# Patient Record
Sex: MALE | Race: WHITE | Employment: OTHER | URBAN - METROPOLITAN AREA
[De-identification: names, ages, dates, MRNs, and addresses within clinical notes are randomized per-mention and may not be internally consistent; named-entity substitution may affect disease eponyms.]

---

## 2017-01-03 PROBLEM — C79.51 SECONDARY MALIGNANT NEOPLASM OF BONE (HCC): Status: ACTIVE | Noted: 2017-01-03

## 2017-04-04 ENCOUNTER — HOSPITAL ENCOUNTER (OUTPATIENT)
Dept: SURGERY | Age: 82
Discharge: HOME | End: 2017-04-04
Attending: RADIOLOGY | Admitting: RADIOLOGY

## 2017-04-04 PROBLEM — N13.5 URETERAL OBSTRUCTION, LEFT: Status: ACTIVE | Noted: 2017-04-04

## 2017-04-04 PROBLEM — C61 PROSTATE CANCER METASTATIC TO MULTIPLE SITES (HCC): Status: ACTIVE | Noted: 2017-04-04

## 2017-04-14 ENCOUNTER — HOSPITAL ENCOUNTER (OUTPATIENT)
Dept: INTERVENTIONAL RADIOLOGY/VASCULAR | Age: 82
Discharge: HOME | End: 2017-04-14
Attending: UROLOGY | Admitting: UROLOGY

## 2017-04-14 DIAGNOSIS — N13.30 HYDRONEPHROSIS, UNSPECIFIED HYDRONEPHROSIS TYPE: ICD-10-CM

## 2017-07-03 ENCOUNTER — OFFICE VISIT (OUTPATIENT)
Dept: CARDIOLOGY CLINIC | Age: 82
End: 2017-07-03

## 2017-07-03 VITALS
BODY MASS INDEX: 28.44 KG/M2 | WEIGHT: 210 LBS | HEART RATE: 108 BPM | SYSTOLIC BLOOD PRESSURE: 110 MMHG | HEIGHT: 72 IN | DIASTOLIC BLOOD PRESSURE: 67 MMHG

## 2017-07-03 DIAGNOSIS — I49.9 IRREGULAR HEART RATE: Primary | ICD-10-CM

## 2017-07-03 DIAGNOSIS — I10 ESSENTIAL HYPERTENSION: ICD-10-CM

## 2017-07-03 DIAGNOSIS — I48.19 PERSISTENT ATRIAL FIBRILLATION (HCC): ICD-10-CM

## 2017-07-03 DIAGNOSIS — C61 PROSTATE CANCER METASTATIC TO MULTIPLE SITES (HCC): ICD-10-CM

## 2017-07-03 DIAGNOSIS — E78.49 OTHER HYPERLIPIDEMIA: ICD-10-CM

## 2017-07-03 PROCEDURE — 4004F PT TOBACCO SCREEN RCVD TLK: CPT | Performed by: INTERNAL MEDICINE

## 2017-07-03 PROCEDURE — 1123F ACP DISCUSS/DSCN MKR DOCD: CPT | Performed by: INTERNAL MEDICINE

## 2017-07-03 PROCEDURE — 4040F PNEUMOC VAC/ADMIN/RCVD: CPT | Performed by: INTERNAL MEDICINE

## 2017-07-03 PROCEDURE — G8419 CALC BMI OUT NRM PARAM NOF/U: HCPCS | Performed by: INTERNAL MEDICINE

## 2017-07-03 PROCEDURE — G8427 DOCREV CUR MEDS BY ELIG CLIN: HCPCS | Performed by: INTERNAL MEDICINE

## 2017-07-03 PROCEDURE — 93000 ELECTROCARDIOGRAM COMPLETE: CPT | Performed by: INTERNAL MEDICINE

## 2017-07-03 PROCEDURE — 99214 OFFICE O/P EST MOD 30 MIN: CPT | Performed by: INTERNAL MEDICINE

## 2017-07-03 RX ORDER — PREDNISONE 10 MG/1
TABLET ORAL
Qty: 10 TABLET | Refills: 0
Start: 2017-07-03

## 2017-07-03 RX ORDER — DILTIAZEM HYDROCHLORIDE 240 MG/1
300 CAPSULE, COATED, EXTENDED RELEASE ORAL DAILY
Qty: 30 CAPSULE | Refills: 6
Start: 2017-07-03 | End: 2017-10-02

## 2017-07-06 ENCOUNTER — TELEPHONE (OUTPATIENT)
Dept: CARDIOLOGY CLINIC | Age: 82
End: 2017-07-06

## 2017-07-06 DIAGNOSIS — I48.19 PERSISTENT ATRIAL FIBRILLATION (HCC): ICD-10-CM

## 2017-07-06 DIAGNOSIS — Z79.899 ENCOUNTER FOR MONITORING DIGOXIN THERAPY: Primary | ICD-10-CM

## 2017-07-06 DIAGNOSIS — Z51.81 ENCOUNTER FOR MONITORING DIGOXIN THERAPY: Primary | ICD-10-CM

## 2017-07-06 RX ORDER — DIGOXIN 125 MCG
250 TABLET ORAL DAILY
Qty: 30 TABLET | Refills: 3 | Status: SHIPPED | OUTPATIENT
Start: 2017-07-06 | End: 2017-12-05 | Stop reason: SDUPTHER

## 2017-09-29 ENCOUNTER — TELEPHONE (OUTPATIENT)
Dept: CARDIOLOGY CLINIC | Age: 82
End: 2017-09-29

## 2017-10-02 ENCOUNTER — TELEPHONE (OUTPATIENT)
Dept: CARDIOLOGY CLINIC | Age: 82
End: 2017-10-02

## 2017-10-02 ENCOUNTER — OFFICE VISIT (OUTPATIENT)
Dept: CARDIOLOGY CLINIC | Age: 82
End: 2017-10-02

## 2017-10-02 VITALS
HEIGHT: 72 IN | BODY MASS INDEX: 27.9 KG/M2 | HEART RATE: 94 BPM | DIASTOLIC BLOOD PRESSURE: 59 MMHG | WEIGHT: 206 LBS | SYSTOLIC BLOOD PRESSURE: 126 MMHG

## 2017-10-02 DIAGNOSIS — I10 ESSENTIAL HYPERTENSION: ICD-10-CM

## 2017-10-02 DIAGNOSIS — I48.19 PERSISTENT ATRIAL FIBRILLATION (HCC): ICD-10-CM

## 2017-10-02 DIAGNOSIS — D64.9 ANEMIA, UNSPECIFIED TYPE: Primary | ICD-10-CM

## 2017-10-02 DIAGNOSIS — E78.2 MIXED HYPERLIPIDEMIA: ICD-10-CM

## 2017-10-02 PROCEDURE — 99214 OFFICE O/P EST MOD 30 MIN: CPT | Performed by: INTERNAL MEDICINE

## 2017-10-02 PROCEDURE — 4040F PNEUMOC VAC/ADMIN/RCVD: CPT | Performed by: INTERNAL MEDICINE

## 2017-10-02 PROCEDURE — G8484 FLU IMMUNIZE NO ADMIN: HCPCS | Performed by: INTERNAL MEDICINE

## 2017-10-02 PROCEDURE — 1123F ACP DISCUSS/DSCN MKR DOCD: CPT | Performed by: INTERNAL MEDICINE

## 2017-10-02 PROCEDURE — G8427 DOCREV CUR MEDS BY ELIG CLIN: HCPCS | Performed by: INTERNAL MEDICINE

## 2017-10-02 PROCEDURE — G8417 CALC BMI ABV UP PARAM F/U: HCPCS | Performed by: INTERNAL MEDICINE

## 2017-10-02 PROCEDURE — 4004F PT TOBACCO SCREEN RCVD TLK: CPT | Performed by: INTERNAL MEDICINE

## 2017-10-02 RX ORDER — PROCHLORPERAZINE MALEATE 10 MG
10 TABLET ORAL EVERY 6 HOURS PRN
COMMUNITY

## 2017-10-02 RX ORDER — DILTIAZEM HYDROCHLORIDE 300 MG/1
300 CAPSULE, COATED, EXTENDED RELEASE ORAL DAILY
COMMUNITY

## 2017-10-02 RX ORDER — DIAPER,BRIEF,INFANT-TODD,DISP
EACH MISCELLANEOUS 2 TIMES DAILY
COMMUNITY

## 2017-10-02 RX ORDER — ALLOPURINOL 100 MG/1
100 TABLET ORAL DAILY
COMMUNITY

## 2017-10-02 RX ORDER — AMOXICILLIN 250 MG
1 CAPSULE ORAL NIGHTLY PRN
COMMUNITY

## 2017-10-02 RX ORDER — HYOSCYAMINE SULFATE 0.125 MG
125 TABLET ORAL
COMMUNITY

## 2017-10-02 RX ORDER — CEPHALEXIN 500 MG/1
500 CAPSULE ORAL 3 TIMES DAILY
COMMUNITY

## 2017-10-02 NOTE — PROGRESS NOTES
Cardiac Follow Up    Referring Provider:  Judit Baltazar MD     Chief Complaint   Patient presents with    Follow-up     to discuss eliquis. Just seen in ER yesterday at [facility].  Atrial Fibrillation     Taking Eliquis.  Hypertension    Hyperlipidemia    Anemia     Low H&H. History of Present Illness:  Mr. Tequila Pitt is an 80year old gentleman here today for follow up of his history of atrial fibrillation,  hyperlipidemia and hypertension. He has undergone CV in the remote past, but he did not maintain sinus rhythm. He does not smoke or drink alcohol. He tells me he feels off balance at times. He states he has to hold onto things when walking. He has 2 ureteral stents in place Rt & Lt with chronic hematuria secondary to prostate cancer (had one in place which was removed in August and replaced); Dr. Jesusita Hutchinson stopped his Coumadin temporarily, but the hematuria has never abated. Mrs. Tequila Pitt states that Dr. Jesusita Hutchinson said he would likely bleed as long as the stents were in place. He was placed on Eliquis at last visit. He was seen in the ER at [facility] for right flank pain. Abx injection given, now on oral. Blood work showed very low H&H but blood was not initiated. He feels very weak and tired, low energy. He also just finished his 8th chemo treatment. He denies chest pain, PND, palpitations, light-headedness. He has chronic SOB with activity. His wife is with him for the visit. Past Medical History:   has a past medical history of Hydronephrosis; Hyperlipidemia; and Osteoarthritis. Surgical History:   has a past surgical history that includes hernia repair. Social History:   reports that he quit smoking about 53 years ago. He uses smokeless tobacco. He reports that he drinks alcohol. Family History:  family history includes Heart Disease in his mother; High Blood Pressure in his mother. There is no history of Stroke.      Home Medications:  Outpatient Prescriptions Marked as Taking for the 10/2/17 encounter (Office Visit) with Sienna Lawton MD   Medication Sig Dispense Refill    diltiazem (CARTIA XT) 300 MG extended release capsule Take 300 mg by mouth daily      allopurinol (ZYLOPRIM) 100 MG tablet Take 100 mg by mouth daily      hydrocortisone 0.5 % cream Apply topically 2 times daily Apply topically 2 times daily.  cephALEXin (KEFLEX) 500 MG capsule Take 500 mg by mouth 3 times daily      hyoscyamine (ANASPAZ;LEVSIN) 125 MCG tablet Take 125 mcg by mouth 1 to 2 tablets 4 times a day      prochlorperazine (COMPAZINE) 10 MG tablet Take 10 mg by mouth every 6 hours as needed      GINSENG PO Take 400 mcg by mouth Takes 2 tablets twice a day      senna-docusate (SENNA-PLUS) 8.6-50 MG per tablet Take 1 tablet by mouth nightly as needed for Constipation      digoxin (LANOXIN) 125 MCG tablet Take 2 tablets by mouth daily 30 tablet 3    predniSONE (DELTASONE) 10 MG tablet Take 5mg (0.5 tabs) twice a day. (Patient taking differently: Take 5mg (0.5 tabs)  daily. ) 10 tablet 0    apixaban (ELIQUIS) 2.5 MG TABS tablet Take 1 tablet by mouth 2 times daily 180 tablet 3    calcium carbonate (OSCAL) 500 MG TABS tablet Take 500 mg by mouth daily      Multiple Vitamins-Minerals (THERAPEUTIC MULTIVITAMIN-MINERALS) tablet Take 1 tablet by mouth daily      atorvastatin (LIPITOR) 10 MG tablet Take 10 mg by mouth daily.  LORazepam (ATIVAN) 1 MG tablet Take 1 mg by mouth nightly.  levothyroxine (SYNTHROID) 50 MCG tablet Take 88 mcg by mouth Daily       meclizine (ANTIVERT) 25 MG tablet Take 25 mg by mouth 2 times daily       vitamin B-12 (CYANOCOBALAMIN) 100 MCG tablet Take 50 mcg by mouth daily.  Omega-3 Fatty Acids (FISH OIL PO) Take  by mouth daily. Allergies:  Review of patient's allergies indicates no known allergies. Review of Systems:   · Constitutional: there has been no unanticipated weight loss.  There's been no change in energy level, sleep pattern, or activity level.     · Eyes: No visual changes or diplopia. No scleral icterus. · ENT: No Headaches, hearing loss or vertigo. No mouth sores or sore throat. · Cardiovascular: Reviewed in HPI  · Respiratory: No cough or wheezing, no sputum production. Shortness of breath  · Gastrointestinal: No abdominal pain, appetite loss, blood in stools. No change in bowel or bladder habits. · Genitourinary: No dysuria, trouble voiding, or hematuria. · Musculoskeletal:  No gait disturbance, weakness or joint complaints. · Integumentary: No rash or pruritis. · Neurological: feels off balance  · Psychiatric: No anxiety, no depression. · Endocrine: No malaise, fatigue or temperature intolerance. No excessive thirst, fluid intake, or urination. No tremor. · Hematologic/Lymphatic: No abnormal bruising or bleeding, blood clots or swollen lymph nodes. · Allergic/Immunologic: No nasal congestion or hives. Physical Examination:    Vitals:    10/02/17 1302   BP: (!) 126/59   Pulse: 94        Constitutional and General Appearance:  Respiratory:  · Normal excursion and expansion without use of accessory muscles  · Resp Auscultation: Normal breath sounds without dullness  Cardiovascular:  · The apical impulse is not displaced  · Irregularly irregular rhythm, no rub, or gallop  + II/VI NED  · Cervical veins are not engorged  · The carotid upstroke is normal in amplitude and contour without delay or bruit  · There is no clubbing, cyanosis of the extremities. · No edema  · Femoral Arteries: 2+ and equal  · Pedal Pulses: 2+ and equal   Abdomen:  · No masses or tenderness  · Bowel sounds present  · No organomegaly appreciated  Neurological/Psychiatric:  · Alert and oriented in all spheres  · Moves all extremities well  · Exhibits normal gait balance and coordination  · No abnormalities of mood, affect, memory, mentation, or behavior are noted    Assessment/Plan:     1. Atrial fibrillation, persistent: HR irregular, controlled.      EKG 7/3/17> AF, RBBB, HR 97. Started Eliquis 2.5mg at last visit. Will now stop it due to his low H&H.   2. Hypertension: BP (!) 126/59 (Site: Right Arm, Position: Sitting, Cuff Size: Medium Adult)  Pulse 94  Ht 6' (1.829 m)  Wt 206 lb (93.4 kg)  BMI 27.94 kg/m2 -- stable   3. Hyperlipidemia: Managed per PCP. 4. Shortness of breath: Stable. No complaints today   7/2/14 Stress echo>  Patient's rhythm appears to be atrial fibrillation. Normal left ventricle size, wall thickness and systolic function with an  estimated ejection fraction of 55%. Mitral annular calcification is present. Mitral valve prolapse of posterior leaflet of mitral valve. Moderate mitral regurgitation is present. The left atrium is dilated. The aortic valve appears sclerotic but opens well. Mild aortic regurgitation is present. There is mild tricuspid regurgitation with RVSP estimated at 31 mmHg. Mild pulmonic regurgitation present. 70% hyperdynamic after stress. 5.  Prostate cancer: Has Lt. ureteral stent due to a \"blood clot\" problem. Dr. Elena Yu stopped his Coumadin as he continues with hematuria (has not abated off Coumadin, likely will continue to bleed while stent in place according to Dr. Elena Yu). Just finished 8 chemo treatments. 6. Anemia: Multifactorial. H&H 7.5 / 24 from 9/26/17. Feels weak and tired (has been on chemo also). Mr. Theresa Bernal has a stable cardiac status. He continues to struggle with his prostate cancer issues, just finished a round of 8 chemo treatments. He is also very anemic, HR 90's. 1. Stop Eliquis due to low H&H. Slip given for CBC and T&S.  2. Will continue with risk factor modifications. 3. Return for regular follow up in 1 month. 4. I spoke to Dr. Leno Crowe (oncologist). I advised Felicia Gaelyobanifly to stop in at his office prior to having blood drawn this afternoon. I appreciate the opportunity of cooperating in the care of this individual.    Gabby Otero.  Amari Maurice M.D., [facility] [place]

## 2017-10-02 NOTE — LETTER
[address] Cardiology [address] [address]  Phone: [phone]  Fax: [phone]    Tali Quijano MD        October 12, 2017     Cira Spring MD  [address]    Patient: Femi Villa  MR Number: [id number]  YOB: 1933  Date of Visit: 10/2/2017    Dear Dr. Cira Spring: Thank you for the request for consultation for [facility] to me for the evaluation of ***. Below are the relevant portions of my assessment and plan of care. Cardiac Follow Up    Referring Provider:  Cira Spring MD     Chief Complaint   Patient presents with    Follow-up     to discuss eliquis. Just seen in ER yesterday at [facility].  Atrial Fibrillation     Taking Eliquis.  Hypertension    Hyperlipidemia    Anemia     Low H&H. History of Present Illness:  Mr. Kd Eckert is an 80year old gentleman here today for follow up of his history of atrial fibrillation,  hyperlipidemia and hypertension. He has undergone CV in the remote past, but he did not maintain sinus rhythm. He does not smoke or drink alcohol. He tells me he feels off balance at times. He states he has to hold onto things when walking. He has 2 ureteral stents in place Rt & Lt with chronic hematuria secondary to prostate cancer (had one in place which was removed in August and replaced); Dr. Alan Lovelace stopped his Coumadin temporarily, but the hematuria has never abated. Mrs. Kd Eckert states that Dr. Alan Lovelace said he would likely bleed as long as the stents were in place. He was placed on Eliquis at last visit. He was seen in the ER at [facility] for right flank pain. Abx injection given, now on oral. Blood work showed very low H&H but blood was not initiated. He feels very weak and tired, low energy. He also just finished his 8th chemo treatment. He denies chest pain, PND, palpitations, light-headedness. He has chronic SOB with activity. His wife is with him for the visit.     Past Medical History: has a past medical history of Hydronephrosis; Hyperlipidemia; and Osteoarthritis. Surgical History:   has a past surgical history that includes hernia repair. Social History:   reports that he quit smoking about 53 years ago. He uses smokeless tobacco. He reports that he drinks alcohol. Family History:  family history includes Heart Disease in his mother; High Blood Pressure in his mother. There is no history of Stroke. Home Medications:  Outpatient Prescriptions Marked as Taking for the 10/2/17 encounter (Office Visit) with Lotus Lantigua MD   Medication Sig Dispense Refill    diltiazem (CARTIA XT) 300 MG extended release capsule Take 300 mg by mouth daily      allopurinol (ZYLOPRIM) 100 MG tablet Take 100 mg by mouth daily      hydrocortisone 0.5 % cream Apply topically 2 times daily Apply topically 2 times daily.  cephALEXin (KEFLEX) 500 MG capsule Take 500 mg by mouth 3 times daily      hyoscyamine (ANASPAZ;LEVSIN) 125 MCG tablet Take 125 mcg by mouth 1 to 2 tablets 4 times a day      prochlorperazine (COMPAZINE) 10 MG tablet Take 10 mg by mouth every 6 hours as needed      GINSENG PO Take 400 mcg by mouth Takes 2 tablets twice a day      senna-docusate (SENNA-PLUS) 8.6-50 MG per tablet Take 1 tablet by mouth nightly as needed for Constipation      digoxin (LANOXIN) 125 MCG tablet Take 2 tablets by mouth daily 30 tablet 3    predniSONE (DELTASONE) 10 MG tablet Take 5mg (0.5 tabs) twice a day. (Patient taking differently: Take 5mg (0.5 tabs)  daily. ) 10 tablet 0    apixaban (ELIQUIS) 2.5 MG TABS tablet Take 1 tablet by mouth 2 times daily 180 tablet 3    calcium carbonate (OSCAL) 500 MG TABS tablet Take 500 mg by mouth daily      Multiple Vitamins-Minerals (THERAPEUTIC MULTIVITAMIN-MINERALS) tablet Take 1 tablet by mouth daily      atorvastatin (LIPITOR) 10 MG tablet Take 10 mg by mouth daily.  LORazepam (ATIVAN) 1 MG tablet Take 1 mg by mouth nightly.  levothyroxine (SYNTHROID) 50 MCG tablet Take 88 mcg by mouth Daily       meclizine (ANTIVERT) 25 MG tablet Take 25 mg by mouth 2 times daily       vitamin B-12 (CYANOCOBALAMIN) 100 MCG tablet Take 50 mcg by mouth daily.  Omega-3 Fatty Acids (FISH OIL PO) Take  by mouth daily. Allergies:  Review of patient's allergies indicates no known allergies. Review of Systems:   · Constitutional: there has been no unanticipated weight loss. There's been no change in energy level, sleep pattern, or activity level. · Eyes: No visual changes or diplopia. No scleral icterus. · ENT: No Headaches, hearing loss or vertigo. No mouth sores or sore throat. · Cardiovascular: Reviewed in HPI  · Respiratory: No cough or wheezing, no sputum production. Shortness of breath  · Gastrointestinal: No abdominal pain, appetite loss, blood in stools. No change in bowel or bladder habits. · Genitourinary: No dysuria, trouble voiding, or hematuria. · Musculoskeletal:  No gait disturbance, weakness or joint complaints. · Integumentary: No rash or pruritis. · Neurological: feels off balance  · Psychiatric: No anxiety, no depression. · Endocrine: No malaise, fatigue or temperature intolerance. No excessive thirst, fluid intake, or urination. No tremor. · Hematologic/Lymphatic: No abnormal bruising or bleeding, blood clots or swollen lymph nodes. · Allergic/Immunologic: No nasal congestion or hives.     Physical Examination:    Vitals:    10/02/17 1302   BP: (!) 126/59   Pulse: 94        Constitutional and General Appearance:  Respiratory:  · Normal excursion and expansion without use of accessory muscles  · Resp Auscultation: Normal breath sounds without dullness  Cardiovascular:  · The apical impulse is not displaced  · Irregularly irregular rhythm, no rub, or gallop  + II/VI NED  · Cervical veins are not engorged  · The carotid upstroke is normal in amplitude and contour without delay or bruit Mr. Tyler Hernandes has a stable cardiac status. He continues to struggle with his prostate cancer issues, just finished a round of 8 chemo treatments. He is also very anemic, HR 90's. 1. Stop Eliquis due to low H&H. Slip given for CBC and T&S.  2. Will continue with risk factor modifications. 3. Return for regular follow up in 1 month. 4. I spoke to Dr. Sam Metz (oncologist). I advised Amanda Mock to stop in at his office prior to having blood drawn this afternoon. I appreciate the opportunity of cooperating in the care of this individual.    Vinny Allen. Hedy Mendiola M.D., [facility] [place]            If you have questions, please do not hesitate to call me. I look forward to following Amanda Mock along with you.     Sincerely,        Jeanmarie Barboza MD

## 2017-10-02 NOTE — MR AVS SNAPSHOT
After Visit Summary             Modesta Villa   10/2/2017 1:00 PM   Office Visit    Description:  Male : 10/14/1933   Provider:  Christopher Jones MD   Department:  [place] [address] and Future Appointments         Below is a list of your follow-up and future appointments. This may not be a complete list as you may have made appointments directly with providers that we are not aware of or your providers may have made some for you. Please call your providers to confirm appointments. It is important to keep your appointments. Please bring your current insurance card, photo ID, co-pay, and all medication bottles to your appointment. If self-pay, payment is expected at the time of service. Your To-Do List     Future Orders Complete By Expires    CBC WITH AUTO DIFFERENTIAL [CCT3704 Custom]  10/2/2017 (Approximate) 10/2/2018    TYPE AND SCREEN [OHH956 Custom]  10/2/2017 (Approximate) 10/2/2018    Follow-Up    Return in about 4 weeks (around 10/30/2017). Information from Your Visit        Department     Name Address Phone Fax    [address] [phone]      You Were Seen for:         Comments    Anemia, unspecified type   [[id number]]         Vital Signs     Blood Pressure Pulse Height Weight Body Mass Index Smoking Status    126/59 (Site: Right Arm, Position: Sitting, Cuff Size: Medium Adult) 94 6' (1.829 m) 206 lb (93.4 kg) 27.94 kg/m2 Former Smoker      Additional Information about your Body Mass Index (BMI)           Your BMI as listed above is considered overweight (25.0-29.9). BMI is an estimate of body fat, calculated from your height and weight. The higher your BMI, the greater your risk of heart disease, high blood pressure, type 2 diabetes, stroke, gallstones, arthritis, sleep apnea, and certain cancers. BMI is not perfect.   It may overestimate body fat in athletes and people who are more muscular. If your body fat is high you can improve your BMI by decreasing your calorie intake and becoming more physically active. Learn more at: [email or web address] [email or web address].comco.uk             Today's Medication Changes          These changes are accurate as of: 10/2/17  1:33 PM.  If you have any questions, ask your nurse or doctor. CHANGE how you take these medications           CARTIA  MG extended release capsule   Instructions: Take 300 mg by mouth daily   Refills:  0   Generic drug:  diltiazem   What changed:  Another medication with the same name was removed. Continue taking this medication, and follow the directions you see here. Changed by:  Uma Marquez MD         STOP taking these medications           apixaban 2.5 MG Tabs tablet   Commonly known as:  ELIQUIS   Stopped by:  Uma Marquez MD               Your Current Medications Are              diltiazem (CARTIA XT) 300 MG extended release capsule Take 300 mg by mouth daily    allopurinol (ZYLOPRIM) 100 MG tablet Take 100 mg by mouth daily    hydrocortisone 0.5 % cream Apply topically 2 times daily Apply topically 2 times daily. cephALEXin (KEFLEX) 500 MG capsule Take 500 mg by mouth 3 times daily    hyoscyamine (ANASPAZ;LEVSIN) 125 MCG tablet Take 125 mcg by mouth 1 to 2 tablets 4 times a day    prochlorperazine (COMPAZINE) 10 MG tablet Take 10 mg by mouth every 6 hours as needed    GINSENG PO Take 400 mcg by mouth Takes 2 tablets twice a day    senna-docusate (SENNA-PLUS) 8.6-50 MG per tablet Take 1 tablet by mouth nightly as needed for Constipation    digoxin (LANOXIN) 125 MCG tablet Take 2 tablets by mouth daily    predniSONE (DELTASONE) 10 MG tablet Take 5mg (0.5 tabs) twice a day.     calcium carbonate (OSCAL) 500 MG TABS tablet Take 500 mg by mouth daily    Multiple Vitamins-Minerals (THERAPEUTIC MULTIVITAMIN-MINERALS) tablet Take 1 tablet by mouth daily atorvastatin (LIPITOR) 10 MG tablet Take 10 mg by mouth daily. LORazepam (ATIVAN) 1 MG tablet Take 1 mg by mouth nightly. levothyroxine (SYNTHROID) 50 MCG tablet Take 88 mcg by mouth Daily     meclizine (ANTIVERT) 25 MG tablet Take 25 mg by mouth 2 times daily     vitamin B-12 (CYANOCOBALAMIN) 100 MCG tablet Take 50 mcg by mouth daily. Omega-3 Fatty Acids (FISH OIL PO) Take  by mouth daily. Allergies           No Known Allergies         Additional Information        Basic Information     Date Of Birth Sex Race Ethnicity Preferred Language    10/14/1933 Male White Non-/Non  English      Problem List as of 10/2/2017  Date Reviewed: 10/2/2017                Anemia    Prostate cancer metastatic to multiple sites [facility])    Ureteral obstruction, left    Secondary malignant neoplasm of bone (HCC)    Atrial fibrillation (HCC)    Hypertension    Hyperlipidemia    Shortness of breath      Preventive Care        Date Due    Tetanus Combination Vaccine (1 - Tdap) 10/14/1952    Zoster Vaccine 10/14/1993    Pneumococcal Vaccines (two) for all adults aged 72 and over (1 of 2 - PCV13) 10/14/1998    Yearly Flu Vaccine (1) 9/1/2017            MyChart Signup           Our records indicate that you have declined MyChart signup.

## 2017-10-12 NOTE — COMMUNICATION BODY
the 10/2/17 encounter (Office Visit) with Blanche Sarmiento MD   Medication Sig Dispense Refill    diltiazem (CARTIA XT) 300 MG extended release capsule Take 300 mg by mouth daily      allopurinol (ZYLOPRIM) 100 MG tablet Take 100 mg by mouth daily      hydrocortisone 0.5 % cream Apply topically 2 times daily Apply topically 2 times daily.  cephALEXin (KEFLEX) 500 MG capsule Take 500 mg by mouth 3 times daily      hyoscyamine (ANASPAZ;LEVSIN) 125 MCG tablet Take 125 mcg by mouth 1 to 2 tablets 4 times a day      prochlorperazine (COMPAZINE) 10 MG tablet Take 10 mg by mouth every 6 hours as needed      GINSENG PO Take 400 mcg by mouth Takes 2 tablets twice a day      senna-docusate (SENNA-PLUS) 8.6-50 MG per tablet Take 1 tablet by mouth nightly as needed for Constipation      digoxin (LANOXIN) 125 MCG tablet Take 2 tablets by mouth daily 30 tablet 3    predniSONE (DELTASONE) 10 MG tablet Take 5mg (0.5 tabs) twice a day. (Patient taking differently: Take 5mg (0.5 tabs)  daily. ) 10 tablet 0    apixaban (ELIQUIS) 2.5 MG TABS tablet Take 1 tablet by mouth 2 times daily 180 tablet 3    calcium carbonate (OSCAL) 500 MG TABS tablet Take 500 mg by mouth daily      Multiple Vitamins-Minerals (THERAPEUTIC MULTIVITAMIN-MINERALS) tablet Take 1 tablet by mouth daily      atorvastatin (LIPITOR) 10 MG tablet Take 10 mg by mouth daily.  LORazepam (ATIVAN) 1 MG tablet Take 1 mg by mouth nightly.  levothyroxine (SYNTHROID) 50 MCG tablet Take 88 mcg by mouth Daily       meclizine (ANTIVERT) 25 MG tablet Take 25 mg by mouth 2 times daily       vitamin B-12 (CYANOCOBALAMIN) 100 MCG tablet Take 50 mcg by mouth daily.  Omega-3 Fatty Acids (FISH OIL PO) Take  by mouth daily. Allergies:  Review of patient's allergies indicates no known allergies. Review of Systems:   · Constitutional: there has been no unanticipated weight loss.  There's been no change in energy level, sleep pattern, or activity level.     · Eyes: No visual changes or diplopia. No scleral icterus. · ENT: No Headaches, hearing loss or vertigo. No mouth sores or sore throat. · Cardiovascular: Reviewed in HPI  · Respiratory: No cough or wheezing, no sputum production. Shortness of breath  · Gastrointestinal: No abdominal pain, appetite loss, blood in stools. No change in bowel or bladder habits. · Genitourinary: No dysuria, trouble voiding, or hematuria. · Musculoskeletal:  No gait disturbance, weakness or joint complaints. · Integumentary: No rash or pruritis. · Neurological: feels off balance  · Psychiatric: No anxiety, no depression. · Endocrine: No malaise, fatigue or temperature intolerance. No excessive thirst, fluid intake, or urination. No tremor. · Hematologic/Lymphatic: No abnormal bruising or bleeding, blood clots or swollen lymph nodes. · Allergic/Immunologic: No nasal congestion or hives. Physical Examination:    Vitals:    10/02/17 1302   BP: (!) 126/59   Pulse: 94        Constitutional and General Appearance:  Respiratory:  · Normal excursion and expansion without use of accessory muscles  · Resp Auscultation: Normal breath sounds without dullness  Cardiovascular:  · The apical impulse is not displaced  · Irregularly irregular rhythm, no rub, or gallop  + II/VI NED  · Cervical veins are not engorged  · The carotid upstroke is normal in amplitude and contour without delay or bruit  · There is no clubbing, cyanosis of the extremities. · No edema  · Femoral Arteries: 2+ and equal  · Pedal Pulses: 2+ and equal   Abdomen:  · No masses or tenderness  · Bowel sounds present  · No organomegaly appreciated  Neurological/Psychiatric:  · Alert and oriented in all spheres  · Moves all extremities well  · Exhibits normal gait balance and coordination  · No abnormalities of mood, affect, memory, mentation, or behavior are noted    Assessment/Plan:     1. Atrial fibrillation, persistent: HR irregular, controlled.      EKG 7/3/17> AF, RBBB, HR 97. Started Eliquis 2.5mg at last visit. Will now stop it due to his low H&H.   2. Hypertension: BP (!) 126/59 (Site: Right Arm, Position: Sitting, Cuff Size: Medium Adult)  Pulse 94  Ht 6' (1.829 m)  Wt 206 lb (93.4 kg)  BMI 27.94 kg/m2 -- stable   3. Hyperlipidemia: Managed per PCP. 4. Shortness of breath: Stable. No complaints today   7/2/14 Stress echo>  Patient's rhythm appears to be atrial fibrillation. Normal left ventricle size, wall thickness and systolic function with an  estimated ejection fraction of 55%. Mitral annular calcification is present. Mitral valve prolapse of posterior leaflet of mitral valve. Moderate mitral regurgitation is present. The left atrium is dilated. The aortic valve appears sclerotic but opens well. Mild aortic regurgitation is present. There is mild tricuspid regurgitation with RVSP estimated at 31 mmHg. Mild pulmonic regurgitation present. 70% hyperdynamic after stress. 5.  Prostate cancer: Has Lt. ureteral stent due to a \"blood clot\" problem. Dr. Mirian Waggoner stopped his Coumadin as he continues with hematuria (has not abated off Coumadin, likely will continue to bleed while stent in place according to Dr. Mirian Waggoner). Just finished 8 chemo treatments. 6. Anemia: Multifactorial. H&H 7.5 / 24 from 9/26/17. Feels weak and tired (has been on chemo also). Mr. Alber Zarate has a stable cardiac status. He continues to struggle with his prostate cancer issues, just finished a round of 8 chemo treatments. He is also very anemic, HR 90's. 1. Stop Eliquis due to low H&H. Slip given for CBC and T&S.  2. Will continue with risk factor modifications. 3. Return for regular follow up in 1 month. 4. I spoke to Dr. Scott Villarreal (oncologist). I advised Tian Cade to stop in at his office prior to having blood drawn this afternoon. I appreciate the opportunity of cooperating in the care of this individual.    Darwin Norris.  Waylon Thurston M.D., [facility] [place]

## 2017-11-06 ENCOUNTER — OFFICE VISIT (OUTPATIENT)
Dept: CARDIOLOGY CLINIC | Age: 82
End: 2017-11-06

## 2017-11-06 VITALS
HEART RATE: 108 BPM | WEIGHT: 205 LBS | SYSTOLIC BLOOD PRESSURE: 126 MMHG | DIASTOLIC BLOOD PRESSURE: 66 MMHG | BODY MASS INDEX: 27.77 KG/M2 | HEIGHT: 72 IN

## 2017-11-06 DIAGNOSIS — R06.02 SHORTNESS OF BREATH: ICD-10-CM

## 2017-11-06 DIAGNOSIS — I48.19 PERSISTENT ATRIAL FIBRILLATION (HCC): Primary | ICD-10-CM

## 2017-11-06 DIAGNOSIS — E78.2 MIXED HYPERLIPIDEMIA: ICD-10-CM

## 2017-11-06 DIAGNOSIS — D64.9 ANEMIA, UNSPECIFIED TYPE: ICD-10-CM

## 2017-11-06 DIAGNOSIS — I10 ESSENTIAL HYPERTENSION: ICD-10-CM

## 2017-11-06 PROCEDURE — 1123F ACP DISCUSS/DSCN MKR DOCD: CPT | Performed by: INTERNAL MEDICINE

## 2017-11-06 PROCEDURE — G8417 CALC BMI ABV UP PARAM F/U: HCPCS | Performed by: INTERNAL MEDICINE

## 2017-11-06 PROCEDURE — G8484 FLU IMMUNIZE NO ADMIN: HCPCS | Performed by: INTERNAL MEDICINE

## 2017-11-06 PROCEDURE — G8427 DOCREV CUR MEDS BY ELIG CLIN: HCPCS | Performed by: INTERNAL MEDICINE

## 2017-11-06 PROCEDURE — 4040F PNEUMOC VAC/ADMIN/RCVD: CPT | Performed by: INTERNAL MEDICINE

## 2017-11-06 PROCEDURE — 99214 OFFICE O/P EST MOD 30 MIN: CPT | Performed by: INTERNAL MEDICINE

## 2017-11-06 PROCEDURE — 4004F PT TOBACCO SCREEN RCVD TLK: CPT | Performed by: INTERNAL MEDICINE

## 2017-11-06 RX ORDER — FUROSEMIDE 20 MG/1
20 TABLET ORAL
Qty: 90 TABLET | Refills: 3 | Status: SHIPPED | OUTPATIENT
Start: 2017-11-06

## 2017-11-06 NOTE — LETTER
[address] [facility] [address] [address]  Phone: [phone]  Fax: [phone]    Mayte Mcdonald MD        November 29, 2017     Damaris Bhandari MD  [address]    Patient: Peña Villa  MR Number: [id number]  YOB: 1933  Date of Visit: 11/6/2017    Dear Dr. Damaris Bhandari:    Below are the relevant portions of my assessment and plan of care. Cardiac Follow Up    Referring Provider:  Damaris Bhandari MD     Chief Complaint   Patient presents with    Follow-up     1 month    Atrial Fibrillation    Hypertension    Hyperlipidemia    Shortness of Breath    Anemia     History of Present Illness:  Mr. Denice Billy is an 80year old gentleman here today for follow up of his history of atrial fibrillation, hyperlipidemia, and hypertension. He has undergone CV in the remote past, but he did not maintain sinus rhythm. He does not smoke or drink alcohol. He tells me he feels off balance at times. He states he has to hold onto things when walking. He has prostate cancer and finished 8 chemo treatments. He has 2 ureteral stents in place Rt & Lt with chronic hematuria secondary to prostate cancer (had one in place which was removed in August and replaced); Dr. Becca Ribera stopped his Coumadin temporarily, but the hematuria has never abated. Mrs. Denice Billy states that Dr. Becca Ribera said he would likely bleed as long as the stents were in place. Eliquis was stopped at last visit d/t anemia. While vacationing in [place] mid-October 2017, he developed gross hematuria that required hospitalization and warranted cystos to stop the bleeding. ECHO was stable at the time with severe MR (unchanged from last). Today, he feels ok, still weak and easily fatigued. He denies chest pain, PND, palpitations, light-headedness. He has chronic SOB with activity. He has frequent trips to the bathroom at night.  He will be seeing his oncologist tomorrow and will have CBC drawn at that time. His wife is with him for the visit. Past Medical History:   has a past medical history of Hydronephrosis; Hyperlipidemia; and Osteoarthritis. Surgical History:   has a past surgical history that includes hernia repair. Social History:   reports that he quit smoking about 53 years ago. He uses smokeless tobacco. He reports that he drinks alcohol. Family History:  family history includes Heart Disease in his mother; High Blood Pressure in his mother. Home Medications:  Outpatient Prescriptions Marked as Taking for the 11/6/17 encounter (Office Visit) with Mary Baker MD   Medication Sig Dispense Refill    diltiazem (CARTIA XT) 300 MG extended release capsule Take 300 mg by mouth daily      allopurinol (ZYLOPRIM) 100 MG tablet Take 100 mg by mouth daily      GINSENG PO Take 400 mcg by mouth Takes 2 tablets twice a day      senna-docusate (SENNA-PLUS) 8.6-50 MG per tablet Take 1 tablet by mouth nightly as needed for Constipation      digoxin (LANOXIN) 125 MCG tablet Take 2 tablets by mouth daily 30 tablet 3    calcium carbonate (OSCAL) 500 MG TABS tablet Take 500 mg by mouth daily      Multiple Vitamins-Minerals (THERAPEUTIC MULTIVITAMIN-MINERALS) tablet Take 1 tablet by mouth daily      atorvastatin (LIPITOR) 10 MG tablet Take 10 mg by mouth daily.  LORazepam (ATIVAN) 1 MG tablet Take 1 mg by mouth nightly.  levothyroxine (SYNTHROID) 50 MCG tablet Take 88 mcg by mouth Daily       meclizine (ANTIVERT) 25 MG tablet Take 25 mg by mouth 2 times daily       vitamin B-12 (CYANOCOBALAMIN) 100 MCG tablet Take 50 mcg by mouth daily.  Omega-3 Fatty Acids (FISH OIL PO) Take  by mouth daily. Allergies:  Review of patient's allergies indicates no known allergies. Review of Systems:   · Constitutional: there has been no unanticipated weight loss.  There's · No abnormalities of mood, affect, memory, mentation, or behavior are noted    Assessment/Plan:     1. Atrial fibrillation, persistent: HR irregular, controlled. EKG 7/3/17> AF, RBBB, HR 97. Remain on Dig .25mg qd  Stopped Eliquis 2.5mg  d/t anemia. 2. Hypertension: /66 (Site: Left Arm, Position: Sitting, Cuff Size: Medium Adult)   Pulse 108   Ht 6' (1.829 m)   Wt 205 lb (93 kg)   BMI 27.80 kg/m²   -- stable   3. Hyperlipidemia: Managed per PCP. 4. Shortness of breath: Add Lasix 20mg to aid breathing. 7/2/14 Stress echo>  Patient's rhythm appears to be atrial fibrillation. Normal left ventricle size, wall thickness and systolic function with an  estimated ejection fraction of 55%. Mitral annular calcification is present. Mitral valve prolapse of posterior leaflet of mitral valve. Moderate mitral regurgitation is present. The left atrium is dilated. The aortic valve appears sclerotic but opens well. Mild aortic regurgitation is present. There is mild tricuspid regurgitation with RVSP estimated at 31 mmHg. Mild pulmonic regurgitation present. 70% hyperdynamic after stress. 5.  Prostate cancer: Has Lt. ureteral stent due to a \"blood clot\" problem. Dr. Angella Baird stopped his Coumadin as he continues with hematuria (has not abated off Coumadin, likely will continue to bleed while stent in place according to Dr. Angella Baird). Finished 8 chemo treatments. 6. Anemia: Multifactorial. Stopped Eliquis at last visit. H&H 9.5 / 29.0 on 10/10/17. H&H 7.5 / 24 from 9/26/17. Admitted in [facility] mid-October for gross hematuria, needed cystos. Due for H&H tomorrow. Mr. Anabel Hutchinson has a stable cardiac status. He continues to struggle with his prostate cancer issues and was recently hospitalized in [place] for gross hematuria. 1. He already scheduled for CBC tomorrow thru oncology office. I advise Hgb should be >9.0, any lower would warrant transfusion.   2. Adding diuretic

## 2017-11-06 NOTE — PROGRESS NOTES
includes Heart Disease in his mother; High Blood Pressure in his mother. Home Medications:  Outpatient Prescriptions Marked as Taking for the 11/6/17 encounter (Office Visit) with Sonya Ring MD   Medication Sig Dispense Refill    diltiazem (CARTIA XT) 300 MG extended release capsule Take 300 mg by mouth daily      allopurinol (ZYLOPRIM) 100 MG tablet Take 100 mg by mouth daily      GINSENG PO Take 400 mcg by mouth Takes 2 tablets twice a day      senna-docusate (SENNA-PLUS) 8.6-50 MG per tablet Take 1 tablet by mouth nightly as needed for Constipation      digoxin (LANOXIN) 125 MCG tablet Take 2 tablets by mouth daily 30 tablet 3    calcium carbonate (OSCAL) 500 MG TABS tablet Take 500 mg by mouth daily      Multiple Vitamins-Minerals (THERAPEUTIC MULTIVITAMIN-MINERALS) tablet Take 1 tablet by mouth daily      atorvastatin (LIPITOR) 10 MG tablet Take 10 mg by mouth daily.  LORazepam (ATIVAN) 1 MG tablet Take 1 mg by mouth nightly.  levothyroxine (SYNTHROID) 50 MCG tablet Take 88 mcg by mouth Daily       meclizine (ANTIVERT) 25 MG tablet Take 25 mg by mouth 2 times daily       vitamin B-12 (CYANOCOBALAMIN) 100 MCG tablet Take 50 mcg by mouth daily.  Omega-3 Fatty Acids (FISH OIL PO) Take  by mouth daily. Allergies:  Review of patient's allergies indicates no known allergies. Review of Systems:   · Constitutional: there has been no unanticipated weight loss. There's been no change in energy level, sleep pattern, or activity level. · Eyes: No visual changes or diplopia. No scleral icterus. · ENT: No Headaches, hearing loss or vertigo. No mouth sores or sore throat. · Cardiovascular: Reviewed in HPI  · Respiratory: No cough or wheezing, no sputum production. Shortness of breath  · Gastrointestinal: No abdominal pain, appetite loss, blood in stools. No change in bowel or bladder habits. · Genitourinary: No dysuria, trouble voiding, or hematuria.   · Musculoskeletal:  No

## 2017-11-29 NOTE — COMMUNICATION BODY
includes Heart Disease in his mother; High Blood Pressure in his mother. Home Medications:  Outpatient Prescriptions Marked as Taking for the 11/6/17 encounter (Office Visit) with Gracy Derw MD   Medication Sig Dispense Refill    diltiazem (CARTIA XT) 300 MG extended release capsule Take 300 mg by mouth daily      allopurinol (ZYLOPRIM) 100 MG tablet Take 100 mg by mouth daily      GINSENG PO Take 400 mcg by mouth Takes 2 tablets twice a day      senna-docusate (SENNA-PLUS) 8.6-50 MG per tablet Take 1 tablet by mouth nightly as needed for Constipation      digoxin (LANOXIN) 125 MCG tablet Take 2 tablets by mouth daily 30 tablet 3    calcium carbonate (OSCAL) 500 MG TABS tablet Take 500 mg by mouth daily      Multiple Vitamins-Minerals (THERAPEUTIC MULTIVITAMIN-MINERALS) tablet Take 1 tablet by mouth daily      atorvastatin (LIPITOR) 10 MG tablet Take 10 mg by mouth daily.  LORazepam (ATIVAN) 1 MG tablet Take 1 mg by mouth nightly.  levothyroxine (SYNTHROID) 50 MCG tablet Take 88 mcg by mouth Daily       meclizine (ANTIVERT) 25 MG tablet Take 25 mg by mouth 2 times daily       vitamin B-12 (CYANOCOBALAMIN) 100 MCG tablet Take 50 mcg by mouth daily.  Omega-3 Fatty Acids (FISH OIL PO) Take  by mouth daily. Allergies:  Review of patient's allergies indicates no known allergies. Review of Systems:   · Constitutional: there has been no unanticipated weight loss. There's been no change in energy level, sleep pattern, or activity level. · Eyes: No visual changes or diplopia. No scleral icterus. · ENT: No Headaches, hearing loss or vertigo. No mouth sores or sore throat. · Cardiovascular: Reviewed in HPI  · Respiratory: No cough or wheezing, no sputum production. Shortness of breath  · Gastrointestinal: No abdominal pain, appetite loss, blood in stools. No change in bowel or bladder habits. · Genitourinary: No dysuria, trouble voiding, or hematuria.   · Musculoskeletal:  No gait disturbance, weakness or joint complaints. · Integumentary: No rash or pruritis. · Neurological: feels off balance  · Psychiatric: No anxiety, no depression. · Endocrine: No malaise, fatigue or temperature intolerance. No excessive thirst, fluid intake, or urination. No tremor. · Hematologic/Lymphatic: No abnormal bruising or bleeding, blood clots or swollen lymph nodes. · Allergic/Immunologic: No nasal congestion or hives. Physical Examination:    Vitals:    11/06/17 1131   BP: 126/66   Pulse: 108        Constitutional and General Appearance:Skin:good turgor,intact without lesions  HEENT: EOMI ,normal  Neck:no JVD    Respiratory:  · Normal excursion and expansion without use of accessory muscles  · Resp Auscultation: Normal breath sounds without dullness  Cardiovascular:  · The apical impulse is not displaced  · Irregularly irregular rhythm, no rub, or gallop  + II/VI NED  · Cervical veins are not engorged  · The carotid upstroke is normal in amplitude and contour without delay or bruit  · There is no clubbing, cyanosis of the extremities. · No edema  · Femoral Arteries: 2+ and equal  · Pedal Pulses: 2+ and equal   Abdomen:  · No masses or tenderness  · Bowel sounds present  · No organomegaly appreciated  Neurological/Psychiatric:  · Alert and oriented in all spheres  · Moves all extremities well  · Exhibits normal gait balance and coordination  · No abnormalities of mood, affect, memory, mentation, or behavior are noted    Assessment/Plan:     1. Atrial fibrillation, persistent: HR irregular, controlled. EKG 7/3/17> AF, RBBB, HR 97. Remain on Dig .25mg qd  Stopped Eliquis 2.5mg  d/t anemia. 2. Hypertension: /66 (Site: Left Arm, Position: Sitting, Cuff Size: Medium Adult)   Pulse 108   Ht 6' (1.829 m)   Wt 205 lb (93 kg)   BMI 27.80 kg/m²   -- stable   3. Hyperlipidemia: Managed per PCP. 4. Shortness of breath: Add Lasix 20mg to aid breathing.    7/2/14 Stress echo>  Patient's rhythm appears to be atrial fibrillation. Normal left ventricle size, wall thickness and systolic function with an  estimated ejection fraction of 55%. Mitral annular calcification is present. Mitral valve prolapse of posterior leaflet of mitral valve. Moderate mitral regurgitation is present. The left atrium is dilated. The aortic valve appears sclerotic but opens well. Mild aortic regurgitation is present. There is mild tricuspid regurgitation with RVSP estimated at 31 mmHg. Mild pulmonic regurgitation present. 70% hyperdynamic after stress. 5.  Prostate cancer: Has Lt. ureteral stent due to a \"blood clot\" problem. Dr. Alan Lovelace stopped his Coumadin as he continues with hematuria (has not abated off Coumadin, likely will continue to bleed while stent in place according to Dr. Alan Lovelace). Finished 8 chemo treatments. 6. Anemia: Multifactorial. Stopped Eliquis at last visit. H&H 9.5 / 29.0 on 10/10/17. H&H 7.5 / 24 from 9/26/17. Admitted in [facility] mid-October for gross hematuria, needed cystos. Due for H&H tomorrow. Mr. Kd Eckert has a stable cardiac status. He continues to struggle with his prostate cancer issues and was recently hospitalized in [place] for gross hematuria. 1. He already scheduled for CBC tomorrow thru oncology office. I advise Hgb should be >9.0, any lower would warrant transfusion. 2. Adding diuretic  3. Will continue with risk factor modifications. 4. Return for regular follow up in 1 month. I appreciate the opportunity of cooperating in the care of this individual.    Agnieszka Sanon.  Ann Whittaker M.D., [facility] [place]

## 2017-12-05 DIAGNOSIS — Z79.899 ENCOUNTER FOR MONITORING DIGOXIN THERAPY: ICD-10-CM

## 2017-12-05 DIAGNOSIS — Z51.81 ENCOUNTER FOR MONITORING DIGOXIN THERAPY: ICD-10-CM

## 2017-12-05 RX ORDER — DIGOXIN 125 MCG
250 TABLET ORAL DAILY
Qty: 60 TABLET | Refills: 6 | Status: SHIPPED | OUTPATIENT
Start: 2017-12-05